# Patient Record
(demographics unavailable — no encounter records)

---

## 2024-11-18 NOTE — HISTORY OF PRESENT ILLNESS
[FreeTextEntry1] :  56 year old male patient who was seen semi-urgently in follow-up with neurogenic voiding dysfunction who has not wanted urodynamics and is managed with condom catheter. He went to urgent care with cloudy urine and question of UTI. The urine came back with culture not growing anything. He has otherwise been feeling fine. Denies fevers, chills, or changes in his voiding habits.

## 2024-11-18 NOTE — ASSESSMENT
[FreeTextEntry1] :  56 year old male patient with cloudy urine which likely is related to other factors. He was negative for UTI. We discussed this and discussed if he wants further workup with urodynamics. He does not want to proceed at this time.  He can keep his follow-up as scheduled in 1 year. All of his questions were answered.    The submitted E/M billing level for this visit reflects the total time spent on the day of the visit including face-to-face time spent with the patient, non-face-to-face review of medical records and relevant information, documentation, and asynchronous communication with the patient after a visit via phone, email, or patients EHR portal after the visit. The medical records reviewed are either scanned into the chart or reviewed with the patient using a patients electronic medical records portal for patients with records not available to [facility] via electronic transmission platforms from other institutions and labs. Time spent counseling and performing coordination of care was also included in determining the appropriate EM billing level.   I have reviewed and verified information regarding the chief complaint and history recorded by the ancillary staff and/or the patient. I have independently reviewed and interpreted tests performed by other physicians and facilities as necessary.   I have discussed with the patient differential diagnosis, reason for auxiliary tests if ordered, risks, benefits, alternatives, and complications of each form of therapy were discussed.  I personally performed the services described in the documentation, reviewed the documentation recorded by the scribe in my presence, and it accurately and completely records my words and actions.